# Patient Record
Sex: FEMALE | Race: WHITE | Employment: FULL TIME | URBAN - METROPOLITAN AREA
[De-identification: names, ages, dates, MRNs, and addresses within clinical notes are randomized per-mention and may not be internally consistent; named-entity substitution may affect disease eponyms.]

---

## 2019-12-10 ENCOUNTER — ANESTHESIA EVENT (OUTPATIENT)
Dept: SURGERY | Age: 59
End: 2019-12-10
Payer: COMMERCIAL

## 2019-12-11 ENCOUNTER — APPOINTMENT (OUTPATIENT)
Dept: GENERAL RADIOLOGY | Age: 59
End: 2019-12-11
Attending: ORTHOPAEDIC SURGERY
Payer: COMMERCIAL

## 2019-12-11 ENCOUNTER — ANESTHESIA (OUTPATIENT)
Dept: SURGERY | Age: 59
End: 2019-12-11
Payer: COMMERCIAL

## 2019-12-11 ENCOUNTER — HOSPITAL ENCOUNTER (OUTPATIENT)
Age: 59
Setting detail: OUTPATIENT SURGERY
Discharge: HOME | End: 2019-12-11
Attending: ORTHOPAEDIC SURGERY | Admitting: ORTHOPAEDIC SURGERY
Payer: COMMERCIAL

## 2019-12-11 VITALS
BODY MASS INDEX: 21.95 KG/M2 | RESPIRATION RATE: 18 BRPM | OXYGEN SATURATION: 98 % | SYSTOLIC BLOOD PRESSURE: 111 MMHG | TEMPERATURE: 98.2 F | HEART RATE: 56 BPM | WEIGHT: 153 LBS | DIASTOLIC BLOOD PRESSURE: 70 MMHG

## 2019-12-11 PROCEDURE — 74011250636 HC RX REV CODE- 250/636: Performed by: REGISTERED NURSE

## 2019-12-11 PROCEDURE — 76010010054 HC POST OP PAIN BLOCK: Performed by: ORTHOPAEDIC SURGERY

## 2019-12-11 PROCEDURE — 77030006788 HC BLD SAW OSC STRY -B: Performed by: ORTHOPAEDIC SURGERY

## 2019-12-11 PROCEDURE — 77030018836 HC SOL IRR NACL ICUM -A: Performed by: ORTHOPAEDIC SURGERY

## 2019-12-11 PROCEDURE — C1713 ANCHOR/SCREW BN/BN,TIS/BN: HCPCS | Performed by: ORTHOPAEDIC SURGERY

## 2019-12-11 PROCEDURE — 74011250636 HC RX REV CODE- 250/636: Performed by: ANESTHESIOLOGY

## 2019-12-11 PROCEDURE — 76942 ECHO GUIDE FOR BIOPSY: CPT | Performed by: ORTHOPAEDIC SURGERY

## 2019-12-11 PROCEDURE — 74011250637 HC RX REV CODE- 250/637: Performed by: ANESTHESIOLOGY

## 2019-12-11 PROCEDURE — 77030040573 HC BUR PROSTEP MICA WRGH -C: Performed by: ORTHOPAEDIC SURGERY

## 2019-12-11 PROCEDURE — 74011250636 HC RX REV CODE- 250/636: Performed by: ORTHOPAEDIC SURGERY

## 2019-12-11 PROCEDURE — 77030003044 HC WRE K WRGH -B: Performed by: ORTHOPAEDIC SURGERY

## 2019-12-11 PROCEDURE — 76210000020 HC REC RM PH II FIRST 0.5 HR: Performed by: ORTHOPAEDIC SURGERY

## 2019-12-11 PROCEDURE — 76210000063 HC OR PH I REC FIRST 0.5 HR: Performed by: ORTHOPAEDIC SURGERY

## 2019-12-11 PROCEDURE — 77030040572 HC DRVR HEX PROSTEP MICA WRGH -C: Performed by: ORTHOPAEDIC SURGERY

## 2019-12-11 PROCEDURE — 77030003602 HC NDL NRV BLK BBMI -B: Performed by: ANESTHESIOLOGY

## 2019-12-11 PROCEDURE — 76010000160 HC OR TIME 0.5 TO 1 HR INTENSV-TIER 1: Performed by: ORTHOPAEDIC SURGERY

## 2019-12-11 PROCEDURE — 77030040571: Performed by: ORTHOPAEDIC SURGERY

## 2019-12-11 PROCEDURE — 76060000032 HC ANESTHESIA 0.5 TO 1 HR: Performed by: ORTHOPAEDIC SURGERY

## 2019-12-11 DEVICE — HV SCREW
Type: IMPLANTABLE DEVICE | Site: FOOT | Status: FUNCTIONAL
Brand: PROSTEP

## 2019-12-11 DEVICE — SCREW BNE L58MM DIA4MM PROSTEP MICA: Type: IMPLANTABLE DEVICE | Site: FOOT | Status: FUNCTIONAL

## 2019-12-11 DEVICE — SCREW BNE L48MM DIA4MM PROSTEP MICA: Type: IMPLANTABLE DEVICE | Site: FOOT | Status: FUNCTIONAL

## 2019-12-11 RX ORDER — SODIUM CHLORIDE 0.9 % (FLUSH) 0.9 %
5-40 SYRINGE (ML) INJECTION EVERY 8 HOURS
Status: DISCONTINUED | OUTPATIENT
Start: 2019-12-11 | End: 2019-12-11 | Stop reason: HOSPADM

## 2019-12-11 RX ORDER — ROPIVACAINE HYDROCHLORIDE 5 MG/ML
INJECTION, SOLUTION EPIDURAL; INFILTRATION; PERINEURAL
Status: COMPLETED | OUTPATIENT
Start: 2019-12-11 | End: 2019-12-11

## 2019-12-11 RX ORDER — PROPOFOL 10 MG/ML
INJECTION, EMULSION INTRAVENOUS
Status: DISCONTINUED | OUTPATIENT
Start: 2019-12-11 | End: 2019-12-11 | Stop reason: HOSPADM

## 2019-12-11 RX ORDER — SODIUM CHLORIDE 0.9 % (FLUSH) 0.9 %
5-40 SYRINGE (ML) INJECTION AS NEEDED
Status: DISCONTINUED | OUTPATIENT
Start: 2019-12-11 | End: 2019-12-11 | Stop reason: HOSPADM

## 2019-12-11 RX ORDER — LIDOCAINE HYDROCHLORIDE 10 MG/ML
0.1 INJECTION INFILTRATION; PERINEURAL AS NEEDED
Status: DISCONTINUED | OUTPATIENT
Start: 2019-12-11 | End: 2019-12-11 | Stop reason: HOSPADM

## 2019-12-11 RX ORDER — SODIUM CHLORIDE 9 MG/ML
25 INJECTION, SOLUTION INTRAVENOUS CONTINUOUS
Status: DISCONTINUED | OUTPATIENT
Start: 2019-12-11 | End: 2019-12-11 | Stop reason: HOSPADM

## 2019-12-11 RX ORDER — PROPOFOL 10 MG/ML
INJECTION, EMULSION INTRAVENOUS AS NEEDED
Status: DISCONTINUED | OUTPATIENT
Start: 2019-12-11 | End: 2019-12-11 | Stop reason: HOSPADM

## 2019-12-11 RX ORDER — CEFAZOLIN SODIUM/WATER 2 G/20 ML
2 SYRINGE (ML) INTRAVENOUS ONCE
Status: COMPLETED | OUTPATIENT
Start: 2019-12-11 | End: 2019-12-11

## 2019-12-11 RX ORDER — MIDAZOLAM HYDROCHLORIDE 1 MG/ML
2 INJECTION, SOLUTION INTRAMUSCULAR; INTRAVENOUS
Status: COMPLETED | OUTPATIENT
Start: 2019-12-11 | End: 2019-12-11

## 2019-12-11 RX ORDER — FAMOTIDINE 20 MG/1
20 TABLET, FILM COATED ORAL ONCE
Status: COMPLETED | OUTPATIENT
Start: 2019-12-11 | End: 2019-12-11

## 2019-12-11 RX ORDER — HYDROMORPHONE HYDROCHLORIDE 2 MG/ML
0.5 INJECTION, SOLUTION INTRAMUSCULAR; INTRAVENOUS; SUBCUTANEOUS
Status: DISCONTINUED | OUTPATIENT
Start: 2019-12-11 | End: 2019-12-11 | Stop reason: HOSPADM

## 2019-12-11 RX ORDER — NALOXONE HYDROCHLORIDE 0.4 MG/ML
0.1 INJECTION, SOLUTION INTRAMUSCULAR; INTRAVENOUS; SUBCUTANEOUS AS NEEDED
Status: DISCONTINUED | OUTPATIENT
Start: 2019-12-11 | End: 2019-12-11 | Stop reason: HOSPADM

## 2019-12-11 RX ORDER — SODIUM CHLORIDE, SODIUM LACTATE, POTASSIUM CHLORIDE, CALCIUM CHLORIDE 600; 310; 30; 20 MG/100ML; MG/100ML; MG/100ML; MG/100ML
100 INJECTION, SOLUTION INTRAVENOUS CONTINUOUS
Status: DISCONTINUED | OUTPATIENT
Start: 2019-12-11 | End: 2019-12-11 | Stop reason: HOSPADM

## 2019-12-11 RX ORDER — FENTANYL CITRATE 50 UG/ML
100 INJECTION, SOLUTION INTRAMUSCULAR; INTRAVENOUS ONCE
Status: COMPLETED | OUTPATIENT
Start: 2019-12-11 | End: 2019-12-11

## 2019-12-11 RX ORDER — HYDROCODONE BITARTRATE AND ACETAMINOPHEN 7.5; 325 MG/1; MG/1
1 TABLET ORAL AS NEEDED
Status: DISCONTINUED | OUTPATIENT
Start: 2019-12-11 | End: 2019-12-11 | Stop reason: HOSPADM

## 2019-12-11 RX ORDER — OXYCODONE HYDROCHLORIDE 5 MG/1
5 TABLET ORAL
Status: DISCONTINUED | OUTPATIENT
Start: 2019-12-11 | End: 2019-12-11 | Stop reason: HOSPADM

## 2019-12-11 RX ADMIN — PROPOFOL 80 MG: 10 INJECTION, EMULSION INTRAVENOUS at 10:00

## 2019-12-11 RX ADMIN — Medication 2 G: at 10:01

## 2019-12-11 RX ADMIN — FENTANYL CITRATE 100 MCG: 50 INJECTION, SOLUTION INTRAMUSCULAR; INTRAVENOUS at 08:35

## 2019-12-11 RX ADMIN — PROPOFOL 140 MCG/KG/MIN: 10 INJECTION, EMULSION INTRAVENOUS at 10:00

## 2019-12-11 RX ADMIN — MEPIVACAINE HYDROCHLORIDE 15 ML: 15 INJECTION, SOLUTION EPIDURAL; INFILTRATION at 08:40

## 2019-12-11 RX ADMIN — ROPIVACAINE HYDROCHLORIDE 15 ML: 5 INJECTION, SOLUTION EPIDURAL; INFILTRATION; PERINEURAL at 08:40

## 2019-12-11 RX ADMIN — ROPIVACAINE HYDROCHLORIDE 10 ML: 5 INJECTION, SOLUTION EPIDURAL; INFILTRATION; PERINEURAL at 08:42

## 2019-12-11 RX ADMIN — SODIUM CHLORIDE, SODIUM LACTATE, POTASSIUM CHLORIDE, AND CALCIUM CHLORIDE 100 ML/HR: 600; 310; 30; 20 INJECTION, SOLUTION INTRAVENOUS at 08:15

## 2019-12-11 RX ADMIN — MIDAZOLAM 2 MG: 1 INJECTION INTRAMUSCULAR; INTRAVENOUS at 08:35

## 2019-12-11 RX ADMIN — FAMOTIDINE 20 MG: 20 TABLET ORAL at 08:15

## 2019-12-11 RX ADMIN — MEPIVACAINE HYDROCHLORIDE 10 ML: 15 INJECTION, SOLUTION EPIDURAL; INFILTRATION at 08:42

## 2019-12-11 NOTE — ANESTHESIA PROCEDURE NOTES
Peripheral Block    Start time: 12/11/2019 8:35 AM  End time: 12/11/2019 8:40 AM  Performed by: Miky Ibanez MD  Authorized by: Miky Ibanez MD       Pre-procedure:    Indications: at surgeon's request and post-op pain management    Preanesthetic Checklist: patient identified, risks and benefits discussed, site marked, timeout performed, anesthesia consent given and patient being monitored    Timeout Time: 08:35          Block Type:   Block Type:  Popliteal  Laterality:  Right  Monitoring:  Continuous pulse ox, frequent vital sign checks, heart rate, responsive to questions and oxygen  Injection Technique:  Single shot  Procedures: ultrasound guided and nerve stimulator    Prep: DuraPrep    Location:  Lower thigh  Needle Type:  Stimuplex  Needle Gauge:  20 G  Needle Localization:  Nerve stimulator and ultrasound guidance  Motor Response: minimal motor response >0.4 mA      Assessment:  Number of attempts:  1  Injection Assessment:  Incremental injection every 5 mL, local visualized surrounding nerve on ultrasound, negative aspiration for blood, no intravascular symptoms, no paresthesia and ultrasound image on chart  Patient tolerance:  Patient tolerated the procedure well with no immediate complications

## 2019-12-11 NOTE — ANESTHESIA PREPROCEDURE EVALUATION
Relevant Problems   No relevant active problems       Anesthetic History               Review of Systems / Medical History  Patient summary reviewed    Pulmonary                   Neuro/Psych              Cardiovascular                  Exercise tolerance: >4 METS     GI/Hepatic/Renal                Endo/Other             Other Findings              Physical Exam    Airway  Mallampati: II  TM Distance: > 6 cm    Mouth opening: Normal     Cardiovascular  Regular rate and rhythm,  S1 and S2 normal,  no murmur, click, rub, or gallop             Dental  No notable dental hx       Pulmonary  Breath sounds clear to auscultation               Abdominal         Other Findings            Anesthetic Plan    ASA: 1  Anesthesia type: total IV anesthesia      Post-op pain plan if not by surgeon: peripheral nerve block single      Anesthetic plan and risks discussed with: Patient

## 2019-12-11 NOTE — DISCHARGE INSTRUCTIONS
INSTRUCTIONS FOLLOWING FOOT SURGERY    ACTIVITY  Elevate foot. No Ice  Protected partial weight bearing on the heel only as tolerated in post op shoe after full sensation returns. Let the office know if dressing gets saturated with water . DRESSING CARE Keep dry and in place until follow up appointment      DIET  Day of Surgery: Clear liquids until no nausea or vomiting; then light, bland diet (Baked chicken, plain rice, grits, scrambled egg, toast). Nothing Greasy, fried or spicy today  Advance to regular diet on second day, unless your doctor orders otherwise. PAIN  Take pain medications as directed by your doctor. Call your doctor if pain is NOT relieved by medication. PAIN MED SIDE EFFECTS  Constipation: Lots of fluids, try prune juice, then OTC stool softeners if necessary  Nausea: Take medication with food. CALL YOUR DOCTOR IF YOU HAVE  Excessive bleeding that does not stop after holding mild pressure over the area. Temperature of 101 degrees or above. Redness, excessive swelling or bruising, and/or green or yellow, smelly discharge from incision. Loss of sensation - cold, white or blue toes. AFTER ANESTHESIA  For the first 24 hours and while taking narcotics for pain: DO NOT Drive, Drink Alcoholic beverages, or make important Decisions. Be aware of dizziness following anesthesia and while taking pain medication. Preventing Infection at Home  We care about preventing infection and avoiding the spread of germs - not only when you are in the hospital but also when you return home. When you return home from the hospital, its important to take the following steps to help prevent infection and avoid spreading germs that could infect you and others. Ask everyone in your home to follow these guidelines, too. Clean Your Hands  · Clean your hands whenever your hands are visibly dirty, before you eat, before or after touching your mouth, nose or eyes, and before preparing food.  Clean them after contact with body fluids, using the restroom, touching animals or changing diapers. · When washing hands, wet them with warm water and work up a lather. Rub hands for at least 15 seconds, then rinse them and pat them dry with a clean towel or paper towel. · When using hand sanitizers, it should take about 15 seconds to rub your hands dry. If not, you probably didnt apply enough . Cover Your Sneeze or Cough  Germs are released into the air whenever you sneeze or cough. To prevent the spread of infection:  · Turn away from other people before coughing or sneezing. · Cover your mouth or nose with a tissue when you cough or sneeze. Put the tissue in the trash. · If you dont have a tissue, cough or sneeze into your upper sleeve, not your hands. · Always clean your hands after coughing or sneezing. Care for Wounds  Your skin is your bodys first line of defense against germs, but an open wound leaves an easy way for germs to enter your body. To prevent infection:  · Clean your hands before and after changing wound dressings, and wear gloves to change dressings if recommended by your doctor. · Take special care with IV lines or other devices inserted into the body. If you must touch them, clean your hands first.  · Follow any specific instructions from your doctor to care for your wounds. Contact your doctor if you experience any signs of infection, such as fever or increased redness at the surgical or wound site. Keep a Clean Home  · Clean or wipe commonly touched hard surfaces like door handles, sinks, tabletops, phones and TV remotes. · Use products labeled disinfectant to kill harmful bacteria and viruses. · Use a clean cloth or paper towel to clean and dry surfaces. Wiping surfaces with a dirty dishcloth, sponge or towel will only spread germs. · Never share toothbrushes, león, drinking glasses, utensils, razor blades, face cloths or bath towels to avoid spreading germs.   · Be sure that the linens that you sleep on are clean. · Keep pets away from wounds and wash your hands after touching pets, their toys or bedding. We care about you and your health. Remember, preventing infections is a team effort between you, your family, friends and health care providers. DISCHARGE SUMMARY from Nurse    PATIENT INSTRUCTIONS:    After general anesthesia or intravenous sedation, for 24 hours or while taking prescription Narcotics:  · Limit your activities  · Do not drive and operate hazardous machinery  · Do not make important personal or business decisions  · Do  not drink alcoholic beverages  · If you have not urinated within 8 hours after discharge, please contact your surgeon on call. *  Please give a list of your current medications to your Primary Care Provider. *  Please update this list whenever your medications are discontinued, doses are      changed, or new medications (including over-the-counter products) are added. *  Please carry medication information at all times in case of emergency situations. These are general instructions for a healthy lifestyle:    No smoking/ No tobacco products/ Avoid exposure to second hand smoke    Surgeon General's Warning:  Quitting smoking now greatly reduces serious risk to your health. Obesity, smoking, and sedentary lifestyle greatly increases your risk for illness    A healthy diet, regular physical exercise & weight monitoring are important for maintaining a healthy lifestyle    You may be retaining fluid if you have a history of heart failure or if you experience any of the following symptoms:  Weight gain of 3 pounds or more overnight or 5 pounds in a week, increased swelling in our hands or feet or shortness of breath while lying flat in bed. Please call your doctor as soon as you notice any of these symptoms; do not wait until your next office visit.     Recognize signs and symptoms of STROKE:    F-face looks uneven    A-arms unable to move or move unevenly    S-speech slurred or non-existent    T-time-call 911 as soon as signs and symptoms begin-DO NOT go       Back to bed or wait to see if you get better-TIME IS BRAIN.

## 2019-12-11 NOTE — ANESTHESIA PROCEDURE NOTES
Peripheral Block    Start time: 12/11/2019 8:41 AM  End time: 12/11/2019 8:42 AM  Performed by: Miky Ibanez MD  Authorized by: Miky Ibanez MD       Pre-procedure: Indications: at surgeon's request and post-op pain management    Preanesthetic Checklist: patient identified, risks and benefits discussed, site marked, timeout performed, anesthesia consent given and patient being monitored    Timeout Time: 08:41          Block Type:   Block Type:   Adductor canal  Laterality:  Right  Monitoring:  Continuous pulse ox, frequent vital sign checks, heart rate, oxygen and responsive to questions  Injection Technique:  Single shot  Procedures: ultrasound guided    Patient Position: supine  Prep: DuraPrep    Location:  Mid thigh  Needle Type:  Stimuplex  Needle Gauge:  20 G  Needle Localization:  Nerve stimulator and ultrasound guidance  Motor Response: minimal motor response >0.4 mA      Assessment:  Number of attempts:  1  Injection Assessment:  Incremental injection every 5 mL, local visualized surrounding nerve on ultrasound, negative aspiration for blood, no intravascular symptoms, no paresthesia and ultrasound image on chart  Patient tolerance:  Patient tolerated the procedure well with no immediate complications

## 2019-12-11 NOTE — ANESTHESIA POSTPROCEDURE EVALUATION
Procedure(s):  RIGHT MIS  BUNIONECTOMY. total IV anesthesia    Anesthesia Post Evaluation        Patient location during evaluation: PACU  Patient participation: complete - patient participated  Level of consciousness: awake and alert  Pain management: adequate  Airway patency: patent  Anesthetic complications: no  Cardiovascular status: acceptable  Respiratory status: acceptable  Hydration status: acceptable  Post anesthesia nausea and vomiting:  none      Vitals Value Taken Time   BP 96/59 12/11/2019 10:49 AM   Temp 36.7 °C (98.1 °F) 12/11/2019 10:43 AM   Pulse 54 12/11/2019 10:50 AM   Resp 18 12/11/2019 10:49 AM   SpO2 100 % 12/11/2019 10:50 AM   Vitals shown include unvalidated device data.

## 2019-12-12 NOTE — OP NOTES
[facility]  OPERATIVE REPORT    Name:  Osmar Sandhu  MR#:  [id number]  :  1960  ACCOUNT #:  [de-identified]  DATE OF SERVICE:  2019    PREOPERATIVE DIAGNOSIS:  Right hallux valgus. POSTOPERATIVE DIAGNOSIS:  Right hallux valgus. PROCEDURE PERFORMED:  Right double-level bunionectomy, [id number]. SURGEON:  Melba Coker III, MD        ANESTHESIA:  Popliteal block with monitored anesthesia care. ESTIMATED BLOOD LOSS:  Minimal.    TOURNIQUET TIME:  24 minutes at 250 mmHg. ANTIBIOTIC PROPHYLAXIS:  Ancef given prior to procedure. INDICATIONS FOR PROCEDURE:  The patient is a 68-year-old white female with symptomatic right hallux valgus, who has failed conservative therapy and desires surgical treatment. Risks and benefits of procedure including, but not limited to risk of anesthetic complications, myocardial infarction, stroke, death; and surgical complications including damage to nerves and blood vessels, risk of infection, incomplete pain relief, risk of malunion, risk of nonunion, risk of recurrence, and need for additional surgery were discussed with the patient. She understands the risks and wishes to proceed with surgery at this time. DETAILS OF PROCEDURE:  The patient's operative site was marked with indelible ink in the preop holding area. A block was placed by the Department of Anesthesia. The patient was brought to the operating room and placed supine. After preop surgical time-out, the right lower extremity was identified as the surgical site, prepped and draped in standard sterile fashion using ChloraPrep solution. A small incision was made near the first TMT joint and a guidewire from the cannulated screw system was advanced inside the intramedullary canal of the first metatarsal.  A separate small incision was made distally.   A ACLEDA Bank kelly was then used to create a [employer] osteotomy with capital fragment shifted approximately 80% to 90% laterally and secured using two guidewires. Cannulated screw was then placed over the guidewire with good purchase noted. An Richard osteotomy was also performed to the proximal phalanx and secured using a headless screw percutaneously as well. The wounds were irrigated and closed using Monocryl and nylon sutures. A sterile dressing was then applied. Anesthesia was discontinued. The patient was transferred back to the recovery bed, taken to the recovery room in satisfactory condition. She appeared to tolerate the procedure well. There were no apparent surgical or anesthetic complications. All needle and sponge count was correct.       Cyrus Fletcher MD      [id number]  D:  12/11/2019 10:53  T:  12/11/2019 19:07  JOB #:  [id number]

## 2025-05-28 ENCOUNTER — HOSPITAL ENCOUNTER (OUTPATIENT)
Dept: LAB | Age: 65
Discharge: HOME | End: 2025-05-31
Payer: COMMERCIAL

## 2025-05-28 DIAGNOSIS — Z01.818 PRE-OP TESTING: ICD-10-CM

## 2025-05-28 LAB — HGB BLD-MCNC: 13.7 G/DL (ref 11.7–15.4)

## 2025-05-28 PROCEDURE — 36415 COLL VENOUS BLD VENIPUNCTURE: CPT

## 2025-05-28 PROCEDURE — 85018 HEMOGLOBIN: CPT

## 2025-05-28 RX ORDER — NICOTINE POLACRILEX 2 MG
GUM BUCCAL
COMMUNITY

## 2025-05-28 RX ORDER — RISEDRONATE SODIUM 150 MG/1
150 TABLET, FILM COATED ORAL
COMMUNITY
Start: 2025-01-07

## 2025-05-28 RX ORDER — ACETAMINOPHEN 160 MG
2000 TABLET,DISINTEGRATING ORAL DAILY
COMMUNITY

## 2025-05-28 NOTE — PERIOP NOTE
Patient verified name and .  Order for consent  was not found in EHR; patient verifies procedure.   Type 2 surgery, PHONE assessment complete.  Orders NOT received.  Labs per surgeon: No orders received.   Labs per anesthesia protocol: Hgb; order signed and held in EHR.     Please come to outpatient lab ([facility]), [address] (located on 3rd floor), any weekday, prior to surgery, between 8AM and 3PM to have lab work completed. Lab work is NOT fasting.  Patient verbalized understanding. Charge nurse to f/u.       Patient answered medical/surgical history questions at their best of ability. All prior to admission medications documented in EPIC.    Patient instructed to continue taking all prescription medications up to the day of surgery but to take only the following medications the day of surgery according to anesthesia guidelines with a small sip of water: NONE. Also, patient is requested to take 2 Tylenol in the morning and then again before bed on the day before surgery. Regular or extra strength may be used.       Patient informed that all vitamins and supplements should be held 7 days prior to surgery and NSAIDS 5 days prior to surgery.    Patient instructed on the following:    > Arrive at [facility] A Entrance, time of arrival to be called the day before by 1700  > No food after midnight, patient may drink clear liquids up until 2 hours prior to arrival. No gum, candy, mints.   > Responsible adult must drive patient to the hospital, stay during surgery, and patient will need supervision 24 hours after anesthesia  > Use non moisturizing soap in shower the night before surgery and on the morning of surgery  > All piercings must be removed prior to arrival.    > Leave all valuables (money and jewelry) at home but bring insurance card and ID on DOS.   > You may be required to pay a deductible or co-pay on the day of your procedure. You can pre-pay by calling [phone] if your  surgery is at the [facility] or [phone] if your surgery is at the [facility].  > Do not wear deodorant, make-up, nail polish, lotions, cologne, perfumes, powders, or oil on skin. Artificial nails are not permitted.

## 2025-06-06 ENCOUNTER — PREP FOR PROCEDURE (OUTPATIENT)
Dept: SURGERY | Age: 65
End: 2025-06-06

## 2025-06-06 RX ORDER — SODIUM CHLORIDE 0.9 % (FLUSH) 0.9 %
5-40 SYRINGE (ML) INJECTION EVERY 12 HOURS SCHEDULED
Status: CANCELLED | OUTPATIENT
Start: 2025-06-06

## 2025-06-06 RX ORDER — SODIUM CHLORIDE 9 MG/ML
INJECTION, SOLUTION INTRAVENOUS PRN
Status: CANCELLED | OUTPATIENT
Start: 2025-06-06

## 2025-06-06 RX ORDER — SODIUM CHLORIDE 0.9 % (FLUSH) 0.9 %
5-40 SYRINGE (ML) INJECTION PRN
Status: CANCELLED | OUTPATIENT
Start: 2025-06-06

## 2025-06-10 ENCOUNTER — ANESTHESIA EVENT (OUTPATIENT)
Dept: SURGERY | Age: 65
End: 2025-06-10
Payer: COMMERCIAL

## 2025-06-10 RX ORDER — MIDAZOLAM HYDROCHLORIDE 2 MG/2ML
2 INJECTION, SOLUTION INTRAMUSCULAR; INTRAVENOUS
Status: CANCELLED | OUTPATIENT
Start: 2025-06-10

## 2025-06-10 RX ORDER — SODIUM CHLORIDE 0.9 % (FLUSH) 0.9 %
5-40 SYRINGE (ML) INJECTION PRN
Status: CANCELLED | OUTPATIENT
Start: 2025-06-10

## 2025-06-10 RX ORDER — SODIUM CHLORIDE 0.9 % (FLUSH) 0.9 %
5-40 SYRINGE (ML) INJECTION EVERY 12 HOURS SCHEDULED
Status: CANCELLED | OUTPATIENT
Start: 2025-06-10

## 2025-06-10 RX ORDER — SODIUM CHLORIDE 9 MG/ML
INJECTION, SOLUTION INTRAVENOUS PRN
Status: CANCELLED | OUTPATIENT
Start: 2025-06-10

## 2025-06-11 ENCOUNTER — ANESTHESIA (OUTPATIENT)
Dept: SURGERY | Age: 65
End: 2025-06-11
Payer: COMMERCIAL

## 2025-06-11 ENCOUNTER — HOSPITAL ENCOUNTER (OUTPATIENT)
Age: 65
Setting detail: OUTPATIENT SURGERY
Discharge: HOME | End: 2025-06-11
Attending: PLASTIC SURGERY | Admitting: PLASTIC SURGERY
Payer: COMMERCIAL

## 2025-06-11 VITALS
TEMPERATURE: 97.7 F | OXYGEN SATURATION: 97 % | HEART RATE: 67 BPM | DIASTOLIC BLOOD PRESSURE: 65 MMHG | RESPIRATION RATE: 12 BRPM | BODY MASS INDEX: 23.21 KG/M2 | SYSTOLIC BLOOD PRESSURE: 110 MMHG | WEIGHT: 162.1 LBS | HEIGHT: 70 IN

## 2025-06-11 DIAGNOSIS — Z01.818 PRE-OP TESTING: Primary | ICD-10-CM

## 2025-06-11 PROCEDURE — 3700000001 HC ADD 15 MINUTES (ANESTHESIA): Performed by: PLASTIC SURGERY

## 2025-06-11 PROCEDURE — 2500000003 HC RX 250 WO HCPCS: Performed by: REGISTERED NURSE

## 2025-06-11 PROCEDURE — 3700000000 HC ANESTHESIA ATTENDED CARE: Performed by: PLASTIC SURGERY

## 2025-06-11 PROCEDURE — 6360000002 HC RX W HCPCS: Performed by: REGISTERED NURSE

## 2025-06-11 PROCEDURE — 6370000000 HC RX 637 (ALT 250 FOR IP): Performed by: PLASTIC SURGERY

## 2025-06-11 PROCEDURE — 88305 TISSUE EXAM BY PATHOLOGIST: CPT

## 2025-06-11 PROCEDURE — 2580000003 HC RX 258: Performed by: ANESTHESIOLOGY

## 2025-06-11 PROCEDURE — 7100000000 HC PACU RECOVERY - FIRST 15 MIN: Performed by: PLASTIC SURGERY

## 2025-06-11 PROCEDURE — 6360000002 HC RX W HCPCS: Performed by: ANESTHESIOLOGY

## 2025-06-11 PROCEDURE — 7100000001 HC PACU RECOVERY - ADDTL 15 MIN: Performed by: PLASTIC SURGERY

## 2025-06-11 PROCEDURE — 3600000002 HC SURGERY LEVEL 2 BASE: Performed by: PLASTIC SURGERY

## 2025-06-11 PROCEDURE — 7100000011 HC PHASE II RECOVERY - ADDTL 15 MIN: Performed by: PLASTIC SURGERY

## 2025-06-11 PROCEDURE — 6360000002 HC RX W HCPCS: Performed by: PLASTIC SURGERY

## 2025-06-11 PROCEDURE — C2626 INFUSION PUMP, NON-PROG,TEMP: HCPCS | Performed by: PLASTIC SURGERY

## 2025-06-11 PROCEDURE — 6370000000 HC RX 637 (ALT 250 FOR IP): Performed by: ANESTHESIOLOGY

## 2025-06-11 PROCEDURE — 2709999900 HC NON-CHARGEABLE SUPPLY: Performed by: PLASTIC SURGERY

## 2025-06-11 PROCEDURE — 7100000010 HC PHASE II RECOVERY - FIRST 15 MIN: Performed by: PLASTIC SURGERY

## 2025-06-11 PROCEDURE — 3600000012 HC SURGERY LEVEL 2 ADDTL 15MIN: Performed by: PLASTIC SURGERY

## 2025-06-11 RX ORDER — MIDAZOLAM HYDROCHLORIDE 1 MG/ML
INJECTION, SOLUTION INTRAMUSCULAR; INTRAVENOUS
Status: DISCONTINUED | OUTPATIENT
Start: 2025-06-11 | End: 2025-06-11 | Stop reason: SDUPTHER

## 2025-06-11 RX ORDER — PROPOFOL 10 MG/ML
INJECTION, EMULSION INTRAVENOUS
Status: DISCONTINUED | OUTPATIENT
Start: 2025-06-11 | End: 2025-06-11 | Stop reason: SDUPTHER

## 2025-06-11 RX ORDER — OXYCODONE HYDROCHLORIDE 5 MG/1
10 TABLET ORAL PRN
Status: DISCONTINUED | OUTPATIENT
Start: 2025-06-11 | End: 2025-06-11 | Stop reason: HOSPADM

## 2025-06-11 RX ORDER — PROCHLORPERAZINE EDISYLATE 5 MG/ML
5 INJECTION INTRAMUSCULAR; INTRAVENOUS
Status: DISCONTINUED | OUTPATIENT
Start: 2025-06-11 | End: 2025-06-11 | Stop reason: HOSPADM

## 2025-06-11 RX ORDER — DEXAMETHASONE SODIUM PHOSPHATE 10 MG/ML
INJECTION, SOLUTION INTRA-ARTICULAR; INTRALESIONAL; INTRAMUSCULAR; INTRAVENOUS; SOFT TISSUE
Status: DISCONTINUED | OUTPATIENT
Start: 2025-06-11 | End: 2025-06-11 | Stop reason: SDUPTHER

## 2025-06-11 RX ORDER — SODIUM CHLORIDE, SODIUM LACTATE, POTASSIUM CHLORIDE, CALCIUM CHLORIDE 600; 310; 30; 20 MG/100ML; MG/100ML; MG/100ML; MG/100ML
INJECTION, SOLUTION INTRAVENOUS CONTINUOUS
Status: DISCONTINUED | OUTPATIENT
Start: 2025-06-11 | End: 2025-06-11 | Stop reason: HOSPADM

## 2025-06-11 RX ORDER — GINSENG 100 MG
CAPSULE ORAL PRN
Status: DISCONTINUED | OUTPATIENT
Start: 2025-06-11 | End: 2025-06-11 | Stop reason: ALTCHOICE

## 2025-06-11 RX ORDER — LIDOCAINE HYDROCHLORIDE 20 MG/ML
INJECTION, SOLUTION EPIDURAL; INFILTRATION; INTRACAUDAL; PERINEURAL
Status: DISCONTINUED | OUTPATIENT
Start: 2025-06-11 | End: 2025-06-11 | Stop reason: SDUPTHER

## 2025-06-11 RX ORDER — LIDOCAINE HYDROCHLORIDE 10 MG/ML
1 INJECTION, SOLUTION INFILTRATION; PERINEURAL
Status: COMPLETED | OUTPATIENT
Start: 2025-06-11 | End: 2025-06-11

## 2025-06-11 RX ORDER — EPHEDRINE SULFATE 5 MG/ML
INJECTION INTRAVENOUS
Status: DISCONTINUED | OUTPATIENT
Start: 2025-06-11 | End: 2025-06-11 | Stop reason: SDUPTHER

## 2025-06-11 RX ORDER — NALOXONE HYDROCHLORIDE 0.4 MG/ML
INJECTION, SOLUTION INTRAMUSCULAR; INTRAVENOUS; SUBCUTANEOUS PRN
Status: DISCONTINUED | OUTPATIENT
Start: 2025-06-11 | End: 2025-06-11 | Stop reason: HOSPADM

## 2025-06-11 RX ORDER — GLYCOPYRROLATE 0.2 MG/ML
INJECTION INTRAMUSCULAR; INTRAVENOUS
Status: DISCONTINUED | OUTPATIENT
Start: 2025-06-11 | End: 2025-06-11 | Stop reason: SDUPTHER

## 2025-06-11 RX ORDER — BUPIVACAINE HYDROCHLORIDE 5 MG/ML
INJECTION, SOLUTION EPIDURAL; INTRACAUDAL; PERINEURAL PRN
Status: DISCONTINUED | OUTPATIENT
Start: 2025-06-11 | End: 2025-06-11 | Stop reason: ALTCHOICE

## 2025-06-11 RX ORDER — LIDOCAINE HYDROCHLORIDE AND EPINEPHRINE 10; 10 MG/ML; UG/ML
INJECTION, SOLUTION INFILTRATION; PERINEURAL PRN
Status: DISCONTINUED | OUTPATIENT
Start: 2025-06-11 | End: 2025-06-11 | Stop reason: ALTCHOICE

## 2025-06-11 RX ORDER — NEOSTIGMINE METHYLSULFATE 1 MG/ML
INJECTION, SOLUTION INTRAVENOUS
Status: DISCONTINUED | OUTPATIENT
Start: 2025-06-11 | End: 2025-06-11 | Stop reason: SDUPTHER

## 2025-06-11 RX ORDER — OXYCODONE HYDROCHLORIDE 5 MG/1
5 TABLET ORAL PRN
Status: DISCONTINUED | OUTPATIENT
Start: 2025-06-11 | End: 2025-06-11 | Stop reason: HOSPADM

## 2025-06-11 RX ORDER — ACETAMINOPHEN 500 MG
1000 TABLET ORAL ONCE
Status: COMPLETED | OUTPATIENT
Start: 2025-06-11 | End: 2025-06-11

## 2025-06-11 RX ORDER — ROCURONIUM BROMIDE 10 MG/ML
INJECTION, SOLUTION INTRAVENOUS
Status: DISCONTINUED | OUTPATIENT
Start: 2025-06-11 | End: 2025-06-11 | Stop reason: SDUPTHER

## 2025-06-11 RX ORDER — ONDANSETRON 2 MG/ML
4 INJECTION INTRAMUSCULAR; INTRAVENOUS
Status: DISCONTINUED | OUTPATIENT
Start: 2025-06-11 | End: 2025-06-11 | Stop reason: HOSPADM

## 2025-06-11 RX ORDER — FENTANYL CITRATE 50 UG/ML
INJECTION, SOLUTION INTRAMUSCULAR; INTRAVENOUS
Status: DISCONTINUED | OUTPATIENT
Start: 2025-06-11 | End: 2025-06-11 | Stop reason: SDUPTHER

## 2025-06-11 RX ORDER — DIPHENHYDRAMINE HYDROCHLORIDE 50 MG/ML
12.5 INJECTION, SOLUTION INTRAMUSCULAR; INTRAVENOUS
Status: DISCONTINUED | OUTPATIENT
Start: 2025-06-11 | End: 2025-06-11 | Stop reason: HOSPADM

## 2025-06-11 RX ORDER — ONDANSETRON 2 MG/ML
INJECTION INTRAMUSCULAR; INTRAVENOUS
Status: DISCONTINUED | OUTPATIENT
Start: 2025-06-11 | End: 2025-06-11 | Stop reason: SDUPTHER

## 2025-06-11 RX ORDER — HYDROMORPHONE HYDROCHLORIDE 2 MG/ML
INJECTION, SOLUTION INTRAMUSCULAR; INTRAVENOUS; SUBCUTANEOUS
Status: DISCONTINUED | OUTPATIENT
Start: 2025-06-11 | End: 2025-06-11 | Stop reason: SDUPTHER

## 2025-06-11 RX ADMIN — MIDAZOLAM 2 MG: 1 INJECTION INTRAMUSCULAR; INTRAVENOUS at 10:12

## 2025-06-11 RX ADMIN — SODIUM CHLORIDE, SODIUM LACTATE, POTASSIUM CHLORIDE, AND CALCIUM CHLORIDE: 600; 310; 30; 20 INJECTION, SOLUTION INTRAVENOUS at 09:23

## 2025-06-11 RX ADMIN — LIDOCAINE HYDROCHLORIDE 60 MG: 20 INJECTION, SOLUTION EPIDURAL; INFILTRATION; INTRACAUDAL; PERINEURAL at 10:22

## 2025-06-11 RX ADMIN — Medication 3 MG: at 11:55

## 2025-06-11 RX ADMIN — ACETAMINOPHEN 1000 MG: 500 TABLET, FILM COATED ORAL at 09:12

## 2025-06-11 RX ADMIN — DEXAMETHASONE SODIUM PHOSPHATE 10 MG: 10 INJECTION INTRAMUSCULAR; INTRAVENOUS at 10:29

## 2025-06-11 RX ADMIN — HYDROMORPHONE HYDROCHLORIDE 0.5 MG: 2 INJECTION INTRAMUSCULAR; INTRAVENOUS; SUBCUTANEOUS at 11:28

## 2025-06-11 RX ADMIN — EPHEDRINE SULFATE 10 MG: 5 INJECTION INTRAVENOUS at 12:28

## 2025-06-11 RX ADMIN — FENTANYL CITRATE 100 MCG: 50 INJECTION, SOLUTION INTRAMUSCULAR; INTRAVENOUS at 10:22

## 2025-06-11 RX ADMIN — SODIUM CHLORIDE, SODIUM LACTATE, POTASSIUM CHLORIDE, AND CALCIUM CHLORIDE: 600; 310; 30; 20 INJECTION, SOLUTION INTRAVENOUS at 11:10

## 2025-06-11 RX ADMIN — PROPOFOL 150 MG: 10 INJECTION, EMULSION INTRAVENOUS at 10:22

## 2025-06-11 RX ADMIN — ROCURONIUM BROMIDE 50 MG: 10 INJECTION, SOLUTION INTRAVENOUS at 10:22

## 2025-06-11 RX ADMIN — Medication 2000 MG: at 10:29

## 2025-06-11 RX ADMIN — EPHEDRINE SULFATE 10 MG: 5 INJECTION INTRAVENOUS at 10:38

## 2025-06-11 RX ADMIN — HYDROMORPHONE HYDROCHLORIDE 1 MG: 2 INJECTION INTRAMUSCULAR; INTRAVENOUS; SUBCUTANEOUS at 12:50

## 2025-06-11 RX ADMIN — EPHEDRINE SULFATE 5 MG: 5 INJECTION INTRAVENOUS at 12:22

## 2025-06-11 RX ADMIN — LIDOCAINE HYDROCHLORIDE 1 ML: 10 INJECTION, SOLUTION INFILTRATION; PERINEURAL at 09:23

## 2025-06-11 RX ADMIN — ONDANSETRON 4 MG: 2 INJECTION INTRAMUSCULAR; INTRAVENOUS at 10:29

## 2025-06-11 RX ADMIN — HYDROMORPHONE HYDROCHLORIDE 0.5 MG: 2 INJECTION INTRAMUSCULAR; INTRAVENOUS; SUBCUTANEOUS at 10:43

## 2025-06-11 RX ADMIN — EPHEDRINE SULFATE 10 MG: 5 INJECTION INTRAVENOUS at 11:43

## 2025-06-11 RX ADMIN — GLYCOPYRROLATE 0.4 MG: 0.2 INJECTION INTRAMUSCULAR; INTRAVENOUS at 11:55

## 2025-06-11 ASSESSMENT — PAIN - FUNCTIONAL ASSESSMENT: PAIN_FUNCTIONAL_ASSESSMENT: 0-10

## 2025-06-11 NOTE — ANESTHESIA PRE PROCEDURE
Department of Anesthesiology  Preprocedure Note       Name:  Ana Maria Givens   Age:  64 y.o.  :  1960                                          MRN:  [id number]         Date:  2025      Surgeon: Surgeon(s):  Blakemore, Elizabeth R, MD    Procedure: Procedure(s):  Bilateral reduction mammoplasty    Medications prior to admission:   Prior to Admission medications    Medication Sig Start Date End Date Taking? Authorizing Provider   Risedronate Sodium 150 MG TABS Take 150 mg by mouth every 30 days 25  Yes Amaris Richardson MD   vitamin D (VITAMIN D3) 50 MCG ( UT) CAPS capsule Take 1 capsule by mouth daily   Yes Amaris Richardson MD   Biotin 1 MG CAPS Take by mouth   Yes Amaris Richardson MD   Calcium Carbonate-Vitamin D (OYSTER SHELL CALCIUM/D) 500-200 MG-UNIT TABS Take 1 tablet by mouth 2 times daily (with meals)   Yes Automatic Reconciliation, Ar   NONFORMULARY Antibiotic prior to dental procedures    ProviderAmaris MD       Current medications:    Current Facility-Administered Medications   Medication Dose Route Frequency Provider Last Rate Last Admin   • lidocaine 1 % injection 1 mL  1 mL IntraDERmal Once PRN Aayush Elena IV, MD       • lactated ringers infusion   IntraVENous Continuous Aayush Elena IV, MD       • ceFAZolin (ANCEF) 2000 mg in sterile water 20 mL IV syringe  2,000 mg IntraVENous Once Blakemore, Elizabeth R, MD           Allergies:  No Known Allergies    Problem List:  There is no problem list on file for this patient.      Past Medical History:        Diagnosis Date   • Osteoporosis        Past Surgical History:        Procedure Laterality Date   • BREAST SURGERY      lumpectomy   • BUNIONECTOMY Right    • GYN      C-sec X 2/     • HYSTERECTOMY (CERVIX STATUS UNKNOWN)     • JOINT REPLACEMENT Right     partial knee       Social History:    Social History     Tobacco Use   • Smoking status: Never   • Smokeless tobacco: Never   Substance Use Topics   • Alcohol

## 2025-06-11 NOTE — DISCHARGE INSTRUCTIONS
INSTRUCTIONS FOLLOWING BREAST SURGERY    ACTIVITY   As tolerated or as directed by your doctor.   DO NOT wear tight or restrictive clothing.   Avoid heavy lifting or pulling (no more than 5 pounds).    DIET   Clear liquids until no nausea or vomiting; then light diet for the first day.   Advance to regular diet on second day, unless your doctor orders otherwise.   If nausea or vomiting continue, call your doctor.    PAIN   Take pain medication as directed by your doctor.   Call your doctor if pain is NOT relieved by medication.   DO NOT take aspirin or blood thinners unless directed by your doctor.    DRESSINGS   If directed by your doctor, remove your dressings 48 hours after you go home.     If your wound has small Steri-Strips undemeath the dressing, these may be    left in place for at least one week, or as directed.    SHOWERS AND BATHING   You may shower or bathe 48 hours after your surgery. DO NOT submerge     the incisions or drain sites in a tub bath.   If you have drains, loop the tab through a belt or scarf tied around your waist,     to free up your hands. Showering with the drains in place is not harmful.    DRAINS   If you go home with a drain, you or a family member will be given care     instructions. You will be shown how to empty, measure and record the      output. This output number is important, as it will determine when the drain     may be removed. The nursing staff will review drain care with you before you go home.    FOLLOW-UP PHONE CALLS   Calls will be made by nursing staff.   If you have any problems, call your doctor.    CALL YOUR DOCTOR IF YOU HAVE:   Excessive bleeding that does not stop after holding mild pressure over the area   Temperature of 101°F or above   Redness, excessive swelling or bruising, uneven size or hardness of the breast,     and/or green or yellow, foul-smelling discharge from incision.   Excessive leakage around drain site    AFTER ANESTHESIA   For the first 24  hours: DO NOT drive, drink alcoholic beverages, or make important       decisions.   Be aware of dizziness following anesthesia and while taking pain medication.

## 2025-06-11 NOTE — ANESTHESIA POSTPROCEDURE EVALUATION
Department of Anesthesiology  Postprocedure Note    Patient: Ana Maria Givens  MRN: [id number]  YOB: 1960  Date of evaluation: 6/11/2025    Procedure Summary       Date: 06/11/25 Room / Location: [facility] MAIN OR 04 / [facility] MAIN OR    Anesthesia Start: 1012 Anesthesia Stop: 1257    Procedure: Bilateral reduction mammoplasty (Bilateral: Breast) Diagnosis:       Hypertrophy of breast      Low back pain, unspecified back pain laterality, unspecified chronicity, unspecified whether sciatica present      Weakness of neck      Intertrigo      (Hypertrophy of breast [N62])      (Low back pain, unspecified back pain laterality, unspecified chronicity, unspecified whether sciatica present [M54.50])      (Weakness of neck [M53.82])      (Intertrigo [L30.4])    Surgeons: Blakemore, Elizabeth R, MD Responsible Provider: Ashvin Abarca MD    Anesthesia Type: general ASA Status: 2            Anesthesia Type: No value filed.    Danni Phase I: Danni Score: 8    Danni Phase II:      Anesthesia Post Evaluation    Patient location during evaluation: PACU  Patient participation: complete - patient participated  Level of consciousness: awake and alert  Airway patency: patent  Nausea & Vomiting: no nausea and no vomiting  Cardiovascular status: hemodynamically stable  Respiratory status: acceptable, nonlabored ventilation and spontaneous ventilation  Hydration status: euvolemic  Comments: /65   Pulse 67   Temp 97.7 °F (36.5 °C) (Infrared)   Resp 12   Ht 1.778 m (5' 10\")   Wt 73.5 kg (162 lb 1.6 oz)   SpO2 97%   BMI 23.26 kg/m²     Multimodal analgesia pain management approach  Pain management: adequate and satisfactory to patient    No notable events documented.

## 2025-06-11 NOTE — BRIEF OP NOTE
Brief Postoperative Note      Patient: Ana Maria Givens  YOB: 1960  MRN: [id number]    Date of Procedure: 6/11/2025    Pre-Op Diagnosis Codes:      * Hypertrophy of breast [N62]     * Low back pain, unspecified back pain laterality, unspecified chronicity, unspecified whether sciatica present [M54.50]     * Weakness of neck [M53.82]     * Intertrigo [L30.4]    Post-Op Diagnosis: Same       Procedure(s):  Bilateral reduction mammoplasty    Surgeon(s):  Blakemore, Elizabeth R, MD    Assistant:  * No surgical staff found *    Anesthesia: General    Estimated Blood Loss (mL): less than 50     Complications: None    Specimens:   ID Type Source Tests Collected by Time Destination   A : Right Breast Tissue Tissue Breast SURGICAL PATHOLOGY Blakemore, Elizabeth R, MD 6/11/2025 1208    B : Left Breast Tissue Tissue Breast SURGICAL PATHOLOGY Blakemore, Elizabeth R, MD 6/11/2025 1208        Implants:  * No implants in log *      Drains: * No LDAs found *    Findings:  Infection Present At Time Of Surgery (PATOS) (choose all levels that have infection present):  No infection present  Other Findings: none    Electronically signed by ELIZABETH R BLAKEMORE, MD on 6/11/2025 at 12:43 PM

## 2025-06-12 NOTE — OP NOTE
[facility] [address]  [address]                            OPERATIVE REPORT      PATIENT NAME: STEPHANIE JAIME               : 1960  MED REC NO: [id number]                       ROOM: [facility] NO: [id number]                       ADMIT DATE: 2025  PROVIDER: Elizabeth R Blakemore, MD    DATE OF SERVICE:  2025    PREOPERATIVE DIAGNOSES:  Symptomatic macromastia.    POSTOPERATIVE DIAGNOSES:  Symptomatic macromastia.    PROCEDURES PERFORMED:  Bilateral breast reduction.    SURGEON:  Elizabeth R Blakemore, MD    ASSISTANT:  abdoulaye MONTIEL    ANESTHESIA:  General.    ESTIMATED BLOOD LOSS:  Minimal.    SPECIMENS REMOVED:  Left breast tissue 645 g and right breast tissue 512 g.    INTRAOPERATIVE FINDINGS:  none     COMPLICATIONS:  Will require delayed primary closure of right nipple-areolar complex.    IMPLANTS:  none    INDICATIONS:  Patient has symptomatic macromastia desiring a breast reduction    DESCRIPTION OF PROCEDURE:  After informed consent was obtained from the patient, she was marked in the holding area in the upright position.  She was then taken to the operating room, placed in the supine position, prepped and draped in the usual fashion.  I began on the right breast, where an 8 cm pedicle was centered on the breast mound.  This was then de-epithelialized with a 10 blade.  Bovie cautery was used to dissect through the medial breast tissue down to the level of chest wall.  10 blade was used to further incise the skin and Bovie cautery was used to complete the medial wedge resection.  Same technique was used for the lateral wedge and then superiorly skin flaps retracted towards the ceiling superior portion that the skin flaps were dissected for approximately 2-3 cm thick.  Next, then the pedicle was retracted towards the ceiling and the superior portion was transected.  Additional dissection was done as indicated.  The  06/12/2025 11:51:36  JOB #:  [id number]    CC:   Elizabeth Blakemore, MD

## (undated) DEVICE — GLOVE SURG SZ 65 THK91MIL LTX FREE SYN POLYISOPRENE

## (undated) DEVICE — SPONGE LAP W18XL18IN WHT COT 4 PLY FLD STRUNG RADPQ DISP ST 2 PER PACK

## (undated) DEVICE — HEX DRIVER: Brand: MICA

## (undated) DEVICE — K-WIRE BLUNT/TROCAR
Type: IMPLANTABLE DEVICE | Site: FOOT | Status: NON-FUNCTIONAL
Removed: 2019-12-11

## (undated) DEVICE — Device

## (undated) DEVICE — AMD ANTIMICROBIAL GAUZE SPONGES,12 PLY USP TYPE VII, 0.2% POLYHEXAMETHYLENE BIGUANIDE HCI (PHMB): Brand: CURITY

## (undated) DEVICE — DRESSING,GAUZE,XEROFORM,CURAD,5"X9",ST: Brand: CURAD

## (undated) DEVICE — SOLUTION IRRIG 1000ML H2O PIC PLAS SHATTERPROOF CONTAINER

## (undated) DEVICE — GLOVE SURG SZ 65 CRM LTX FREE POLYISOPRENE POLYMER BEAD ANTI

## (undated) DEVICE — DRAPE C ARM W54XL84IN MINI FOR OEC 6800

## (undated) DEVICE — BUTTON SWITCH PENCIL BLADE ELECTRODE, HOLSTER: Brand: EDGE

## (undated) DEVICE — REM POLYHESIVE ADULT PATIENT RETURN ELECTRODE: Brand: VALLEYLAB

## (undated) DEVICE — MINOR SPLIT GENERAL: Brand: MEDLINE INDUSTRIES, INC.

## (undated) DEVICE — NEEDLE HYPO 21GA L1.5IN INTRAMUSCULAR S STL LATCH BVL UP

## (undated) DEVICE — SUTURE MONOCRYL SZ 4-0 L18IN ABSRB UD L19MM PS-2 3/8 CIR PRIM Y496G

## (undated) DEVICE — BURR: Brand: MICA

## (undated) DEVICE — INTENDED FOR TISSUE SEPARATION, AND OTHER PROCEDURES THAT REQUIRE A SHARP SURGICAL BLADE TO PUNCTURE OR CUT.: Brand: BARD-PARKER ® STAINLESS STEEL BLADES

## (undated) DEVICE — (D)PREP SKN CHLRAPRP APPL 26ML -- CONVERT TO ITEM 371833

## (undated) DEVICE — BASIC SINGLE BASIN 2-LF: Brand: MEDLINE INDUSTRIES, INC.

## (undated) DEVICE — UNIVERSAL DRAPES: Brand: MEDLINE INDUSTRIES, INC.

## (undated) DEVICE — PRECISION THIN, OFFSET (5.5 X 0.38 X 25.0MM)

## (undated) DEVICE — WEDGE BURR: Brand: MICA

## (undated) DEVICE — PUMP PAIN MGMT 400ML 4ML/HR 2 W/ SIL SOAK CATH FOR ABD

## (undated) DEVICE — FOOT & ANKLE SOFT DR WOMACK: Brand: MEDLINE INDUSTRIES, INC.

## (undated) DEVICE — BNDG ELAS ESMARK 4INX12FT LF -- STRL

## (undated) DEVICE — SUTURE VICRYL SZ 4-0 L18IN ABSRB UD L19MM PS-2 3/8 CIR PRIM J496H

## (undated) DEVICE — PAD,ABDOMINAL,5"X9",ST,LF,25/BX: Brand: MEDLINE INDUSTRIES, INC.

## (undated) DEVICE — SUTURE ABSRB X-1 REV CUT 1/2 CIR 22MM UD BRAID 27IN SZ 3-0 J458H

## (undated) DEVICE — SOLUTION IRRIG 1000ML 0.9% SOD CHL USP POUR PLAS BTL

## (undated) DEVICE — BNDG ELAS COBAN 2INX5YD NS --

## (undated) DEVICE — SUTURE MONOCRYL SZ 5-0 L18IN ABSRB UD L19MM PS-2 3/8 CIR PRIM Y495G

## (undated) DEVICE — SUTURE NONABSORBABLE MONOFILAMENT 4-0 PS-2 18 IN BLK ETHILON 1667G

## (undated) DEVICE — BANDAGE,GAUZE,BULKEE II,4.5"X4.1YD,STRL: Brand: MEDLINE

## (undated) DEVICE — CARDINAL HEALTH FLEXIBLE LIGHT HANDLE COVER: Brand: CARDINAL HEALTH

## (undated) DEVICE — PADDING CAST W2INXL4YD ST COT COHESIVE HND TEARABLE SPEC

## (undated) DEVICE — WET SKIN PREP TRAY: Brand: MEDLINE INDUSTRIES, INC.

## (undated) DEVICE — TAPE SURG W4INXL10YD SFT CLTH H2O RESIST MEDIPORE H

## (undated) DEVICE — DRSG GZ OIL EMUL CURAD 3X8 --

## (undated) DEVICE — SOLUTION IV 1000ML 0.9% SOD CHL

## (undated) DEVICE — DRILL BIT: Brand: MICA

## (undated) DEVICE — BANDAGE,GAUZE,CONFORMING,2"X75",STRL,LF: Brand: MEDLINE INDUSTRIES, INC.